# Patient Record
Sex: FEMALE | Race: WHITE | NOT HISPANIC OR LATINO | Employment: FULL TIME
[De-identification: names, ages, dates, MRNs, and addresses within clinical notes are randomized per-mention and may not be internally consistent; named-entity substitution may affect disease eponyms.]

---

## 2021-12-06 ENCOUNTER — OFFICE VISIT (OUTPATIENT)
Dept: MEDICAL GROUP | Facility: CLINIC | Age: 56
End: 2021-12-06
Payer: COMMERCIAL

## 2021-12-06 VITALS
HEIGHT: 67 IN | HEART RATE: 78 BPM | SYSTOLIC BLOOD PRESSURE: 110 MMHG | TEMPERATURE: 98 F | WEIGHT: 234 LBS | DIASTOLIC BLOOD PRESSURE: 80 MMHG | BODY MASS INDEX: 36.73 KG/M2 | OXYGEN SATURATION: 96 %

## 2021-12-06 DIAGNOSIS — R73.09 ELEVATED HEMOGLOBIN A1C: ICD-10-CM

## 2021-12-06 DIAGNOSIS — E06.3 HYPOTHYROIDISM DUE TO HASHIMOTO'S THYROIDITIS: ICD-10-CM

## 2021-12-06 DIAGNOSIS — H10.13 ALLERGIC CONJUNCTIVITIS OF BOTH EYES: ICD-10-CM

## 2021-12-06 DIAGNOSIS — I25.10 ASCVD (ARTERIOSCLEROTIC CARDIOVASCULAR DISEASE): ICD-10-CM

## 2021-12-06 DIAGNOSIS — H92.01 EAR PAIN, RIGHT: ICD-10-CM

## 2021-12-06 DIAGNOSIS — M75.52 SUBACROMIAL BURSITIS OF LEFT SHOULDER JOINT: ICD-10-CM

## 2021-12-06 DIAGNOSIS — E03.8 HYPOTHYROIDISM DUE TO HASHIMOTO'S THYROIDITIS: ICD-10-CM

## 2021-12-06 PROCEDURE — 99214 OFFICE O/P EST MOD 30 MIN: CPT | Performed by: FAMILY MEDICINE

## 2021-12-06 RX ORDER — LIDOCAINE 50 MG/G
1 PATCH TOPICAL EVERY 24 HOURS
Qty: 10 PATCH | Refills: 1 | Status: SHIPPED
Start: 2021-12-06 | End: 2022-01-03

## 2021-12-06 RX ORDER — OMEPRAZOLE 20 MG/1
CAPSULE, DELAYED RELEASE ORAL
COMMUNITY
Start: 2021-10-13 | End: 2022-01-03 | Stop reason: SDUPTHER

## 2021-12-06 RX ORDER — LEVOTHYROXINE SODIUM 0.12 MG/1
125 TABLET ORAL
COMMUNITY
End: 2022-01-03 | Stop reason: SDUPTHER

## 2021-12-06 RX ORDER — OLOPATADINE HYDROCHLORIDE 2 MG/ML
1 SOLUTION/ DROPS OPHTHALMIC DAILY
Qty: 2.5 ML | Refills: 5 | Status: SHIPPED | OUTPATIENT
Start: 2021-12-06 | End: 2024-01-25

## 2021-12-06 ASSESSMENT — FIBROSIS 4 INDEX: FIB4 SCORE: 0.87

## 2021-12-06 ASSESSMENT — PATIENT HEALTH QUESTIONNAIRE - PHQ9: CLINICAL INTERPRETATION OF PHQ2 SCORE: 0

## 2021-12-06 NOTE — PROGRESS NOTES
Subjective:   Kelly Wang is a 56 y.o. female here today to discuss patient here for lab follow-up and for a few acute issues.  She reports getting her labs done about 2 weeks ago, and they were ordered almost a year ago as she was concerned about Covid.  I reviewed labs with her today.  She is also concerned about some left shoulder pain with sudden onset about a week ago after sleeping.  Gets worse at night.  She is unable to lay on that shoulder due to discomfort.  She is significantly limited on her lateral range of motion, but otherwise is able to move it fine and does not recall a specific injury.  She is also having some issues with allergy eyes and is requesting a recommendation for medication.  She is also requesting a look in her ears.  She was prescribed a steroid drop recently for itchiness that she recently started taking and wants to make sure that her ears look okay.    ROS:  See HPI    Patient Active Problem List   Diagnosis   • Hypothyroidism due to Hashimoto's thyroiditis   • Elevated hemoglobin A1c       Current medicines (including changes today)  Current Outpatient Medications   Medication Sig Dispense Refill   • omeprazole (PRILOSEC) 20 MG delayed-release capsule      • levothyroxine (SYNTHROID) 125 MCG Tab Take 125 mcg by mouth every morning on an empty stomach. 1 po dialy     • lidocaine (LIDODERM) 5 % Patch Place 1 Patch on the skin every 24 hours. 10 Patch 1   • olopatadine HCl (PATADAY) 0.2 % ophthalmic solution Administer 1 Drop into both eyes every day. 2.5 mL 5     No current facility-administered medications for this visit.     Social History     Tobacco Use   • Smoking status: Never Smoker   • Smokeless tobacco: Never Used   Substance Use Topics   • Alcohol use: Not Currently   • Drug use: Not Currently     family history is not on file.     Objective:     Vitals:    12/06/21 1306   BP: 110/80   BP Location: Right arm   Pulse: 78   Temp: 36.7 °C (98 °F)   SpO2: 96%   Weight: 106 kg  "(234 lb)   Height: 1.702 m (5' 7\")     Body mass index is 36.65 kg/m².     Physical Exam:  Gen: Well developed, well nourished in no acute distress.   Skin: Pink, warm, and dry  HEENT: conjunctiva non-injected, sclera non-icteric. EOMs intact.  Cerumen noted, without irritation to the external canal.  Lungs: Effort is normal. Clear to auscultation bilaterally with good excursion.  CV: regular rate and rhythm without murmur  MSK: Left shoulder with significant tenderness to palpation over subacromial bursa.  Very limited lateral range of motion with active and passive range due to pain.  Normal rotator cuff exam  PSYCH: euthymic mood and reactive affect        Assessment and Plan:   The following treatment plan was discussed:   1. Subacromial bursitis of left shoulder joint  Exam consistent with the above.  I recommended watchful waiting, conservative treatment with NSAIDs, lidocaine patches, and active stretching and exercising at home.  Also discussed possibility of steroid injection, and formal physical therapy.  Patient will start with conservative management for now and will report back if she experiences no improvement in the next few weeks.  - lidocaine (LIDODERM) 5 % Patch; Place 1 Patch on the skin every 24 hours.  Dispense: 10 Patch; Refill: 1  2. Ear pain, right  Reassurance given.  Exam normal today.  3. Hypothyroidism due to Hashimoto's thyroiditis  Reviewed normal labs.  Repeat labs in 1 year.  4. ASCVD (arteriosclerotic cardiovascular disease)  ASCVD of 3.5 at this time.  I recommended no statin therapy at this time, and patient would like to try some lifestyle modifications in the interim.  5. Elevated hemoglobin A1c  Discussed with patient that is a possibility that this could be a measurement error, though I did recommend some lifestyle modification including diet and exercise.  She is to try to exercise and eat a little bit healthier over the next year, avoiding added sugars.  6. Allergic " conjunctivitis of both eyes  - olopatadine HCl (PATADAY) 0.2 % ophthalmic solution; Administer 1 Drop into both eyes every day.  Dispense: 2.5 mL; Refill: 5    Other orders  - omeprazole (PRILOSEC) 20 MG delayed-release capsule  - levothyroxine (SYNTHROID) 125 MCG Tab; Take 125 mcg by mouth every morning on an empty stomach. 1 po dialy          Followup: Return in about 1 year (around 12/6/2022).      Please note that this dictation was created using voice recognition software. I have worked with consultants from the vendor as well as technical experts from [facility] to optimize the interface. I have made every reasonable attempt to correct obvious errors, but I expect that there are errors of grammar and possibly content that I did not discover before finalizing the note.

## 2022-01-03 ENCOUNTER — PATIENT MESSAGE (OUTPATIENT)
Dept: MEDICAL GROUP | Facility: CLINIC | Age: 57
End: 2022-01-03

## 2022-01-03 RX ORDER — LEVOTHYROXINE SODIUM 0.12 MG/1
125 TABLET ORAL
Qty: 90 TABLET | Refills: 3 | Status: SHIPPED | OUTPATIENT
Start: 2022-01-03 | End: 2022-01-03 | Stop reason: SDUPTHER

## 2022-01-03 RX ORDER — IBUPROFEN 800 MG/1
800 TABLET ORAL EVERY 8 HOURS PRN
Qty: 90 TABLET | Refills: 3 | Status: SHIPPED | OUTPATIENT
Start: 2022-01-03

## 2022-01-03 RX ORDER — IBUPROFEN 800 MG/1
800 TABLET ORAL EVERY 8 HOURS PRN
Qty: 90 TABLET | Refills: 3 | Status: SHIPPED | OUTPATIENT
Start: 2022-01-03 | End: 2022-01-03 | Stop reason: SDUPTHER

## 2022-01-03 RX ORDER — LEVOTHYROXINE SODIUM 0.12 MG/1
125 TABLET ORAL
Qty: 90 TABLET | Refills: 3 | Status: SHIPPED
Start: 2022-01-03 | End: 2022-01-03

## 2022-01-03 RX ORDER — OMEPRAZOLE 20 MG/1
20 CAPSULE, DELAYED RELEASE ORAL DAILY
Qty: 90 CAPSULE | Refills: 3 | Status: SHIPPED | OUTPATIENT
Start: 2022-01-03 | End: 2022-10-05

## 2022-01-03 RX ORDER — OMEPRAZOLE 20 MG/1
20 CAPSULE, DELAYED RELEASE ORAL DAILY
Qty: 90 CAPSULE | Refills: 3 | Status: SHIPPED
Start: 2022-01-03 | End: 2022-01-03

## 2022-01-03 RX ORDER — LIDOCAINE 4 G/G
1 PATCH TOPICAL 2 TIMES DAILY PRN
Qty: 30 PATCH | Refills: 2 | Status: SHIPPED | OUTPATIENT
Start: 2022-01-03

## 2022-01-03 RX ORDER — OMEPRAZOLE 20 MG/1
20 CAPSULE, DELAYED RELEASE ORAL DAILY
Qty: 90 CAPSULE | Refills: 3 | Status: SHIPPED | OUTPATIENT
Start: 2022-01-03 | End: 2022-01-03 | Stop reason: SDUPTHER

## 2022-01-03 RX ORDER — LIDOCAINE 4 G/G
1 PATCH TOPICAL 2 TIMES DAILY PRN
Qty: 30 PATCH | Refills: 2 | Status: SHIPPED | OUTPATIENT
Start: 2022-01-03 | End: 2022-01-03 | Stop reason: SDUPTHER

## 2022-01-03 RX ORDER — LEVOTHYROXINE SODIUM 0.12 MG/1
125 TABLET ORAL
Qty: 90 TABLET | Refills: 3 | Status: SHIPPED | OUTPATIENT
Start: 2022-01-03 | End: 2022-10-05

## 2022-01-03 NOTE — TELEPHONE ENCOUNTER
----- Message from Kelly Wang sent at 1/3/2022  1:24 PM PST -----  Regarding: Bursitis   Hi Tiffany could you please call in antibiotics I want to try that before a cortisone  shot also call in 4 % lidocaine patch ins didn’t cover 5% and if you could prescribe ibuprofen 800 mg for pain Kelly [phone]

## 2022-01-17 ENCOUNTER — PATIENT MESSAGE (OUTPATIENT)
Dept: MEDICAL GROUP | Facility: CLINIC | Age: 57
End: 2022-01-17

## 2022-01-18 NOTE — PROGRESS NOTES
Edward Ho, I'm not sure who I am supposed to route this to.  Please get this patient scheduled for telemed visit.  Can be with me if I have availability or can be with any provider.  Thanks!

## 2022-01-20 ENCOUNTER — TELEMEDICINE (OUTPATIENT)
Dept: MEDICAL GROUP | Facility: OTHER | Age: 57
End: 2022-01-20
Payer: COMMERCIAL

## 2022-01-20 VITALS — TEMPERATURE: 98.6 F

## 2022-01-20 DIAGNOSIS — M75.52 SUBACROMIAL BURSITIS OF LEFT SHOULDER JOINT: ICD-10-CM

## 2022-01-20 DIAGNOSIS — L03.114 CELLULITIS OF LEFT UPPER EXTREMITY: ICD-10-CM

## 2022-01-20 PROCEDURE — 99213 OFFICE O/P EST LOW 20 MIN: CPT | Mod: GT | Performed by: FAMILY MEDICINE

## 2022-01-20 RX ORDER — COVID-19 MOLECULAR TEST ASSAY
KIT MISCELLANEOUS
COMMUNITY
Start: 2021-12-05 | End: 2022-01-20

## 2022-01-20 RX ORDER — CEPHALEXIN 500 MG/1
500 CAPSULE ORAL 3 TIMES DAILY
Qty: 21 CAPSULE | Refills: 0 | Status: SHIPPED | OUTPATIENT
Start: 2022-01-20 | End: 2022-01-27

## 2022-01-21 NOTE — PROGRESS NOTES
Subjective:   Kelly Wang is a 56 y.o. female seen over Zoom due to the COVID-19 pandemic.  Patient consented to Zoom visit and her identity was confirmed.  Patient would like to discuss left shoulder pain.  We discussed this at her last visit, and I felt at the time that this was due to subacromial bursitis.  She feels like since I last saw her, her pain has progressively worsened.  She reports poor range of motion and is concerned that it could be infected due to a new puncture wound that she noted on the top of her left shoulder about a week ago.  She feels that her shoulder is more swollen than the other side and warmer.  She feels that the skin is a little bit more red.  She denies fever, chills, nausea, vomiting.  She has otherwise been well.  She has not had any other new injuries.    ROS:  See HPI    Patient Active Problem List   Diagnosis   • Hypothyroidism due to Hashimoto's thyroiditis   • Elevated hemoglobin A1c       Current medicines (including changes today)  Current Outpatient Medications   Medication Sig Dispense Refill   • cephALEXin (KEFLEX) 500 MG Cap Take 1 Capsule by mouth 3 times a day for 7 days. 21 Capsule 0   • omeprazole (PRILOSEC) 20 MG delayed-release capsule Take 1 Capsule by mouth every day. 90 Capsule 3   • Lidocaine 4 % Patch Apply 1 Patch topically 2 times a day as needed (Shoulder pain). 30 Patch 2   • levothyroxine (SYNTHROID) 125 MCG Tab Take 1 Tablet by mouth every morning on an empty stomach. 1 po dialy 90 Tablet 3   • ibuprofen (MOTRIN) 800 MG Tab Take 1 Tablet by mouth every 8 hours as needed for Mild Pain or Moderate Pain. 90 Tablet 3   • olopatadine HCl (PATADAY) 0.2 % ophthalmic solution Administer 1 Drop into both eyes every day. 2.5 mL 5     No current facility-administered medications for this visit.     Social History     Tobacco Use   • Smoking status: Never Smoker   • Smokeless tobacco: Never Used   Vaping Use   • Vaping Use: Never used   Substance Use Topics   •  Alcohol use: Not Currently   • Drug use: Not Currently     family history is not on file.     Objective:     Vitals:    01/20/22 1544   Temp: 37 °C (98.6 °F)   TempSrc: Temporal     There is no height or weight on file to calculate BMI.     Physical Exam:  Gen: Well developed, well nourished in no acute distress.   Skin: Pink, warm, and dry.  Possible erythema noted to skin of top of shoulder compared to right shoulder. Difficult to tell due to lighting.  Puncture wound noted to top of left shoulder.  Ext:  No swelling or ROM differences noted visually between shoulders.        Assessment and Plan:   The following treatment plan was discussed:   1. Cellulitis of left upper extremity  Fairly difficult to evaluate over Zoom, though I am fairly confident based on my visual inspection that the patient does not have a septic joint.  There is possible increased erythema over her left upper extremity with a puncture wound.  I will treat for cellulitis, though I stated if this does not improve her symptoms at all, patient will need to be seen in clinic for an evaluation or sooner if symptoms worsen over the course of time.  I discussed risks and benefits of medication.  - cephALEXin (KEFLEX) 500 MG Cap; Take 1 Capsule by mouth 3 times a day for 7 days.  Dispense: 21 Capsule; Refill: 0    2. Subacromial bursitis of left shoulder joint  I still believe that a majority of her pain might be due to subacromial bursitis based on my exam when she was in the office.  I do not have strong suspicion for septic bursitis.  I will have her treat with antibiotics per above, though I encouraged anti-inflammatories, and discussed different strengthening and stretching exercises that she can do at home, which may also help with her symptoms.  I will have her follow-up in clinic for an evaluation if there is no improvement in her symptoms.  I did discuss steroid injection, which she would like to defer for this time.        Followup: Return  if symptoms worsen or fail to improve.    Please note that this dictation was created using voice recognition software. I have worked with consultants from the vendor as well as [facility] to optimize the interface. I have made every reasonable attempt to correct obvious errors, but I expect that there are errors of grammar and possibly content that I did not discover before finalizing the note.

## 2022-09-26 ENCOUNTER — OFFICE VISIT (OUTPATIENT)
Dept: URGENT CARE | Facility: CLINIC | Age: 57
End: 2022-09-26
Payer: COMMERCIAL

## 2022-09-26 VITALS
DIASTOLIC BLOOD PRESSURE: 84 MMHG | RESPIRATION RATE: 18 BRPM | SYSTOLIC BLOOD PRESSURE: 118 MMHG | OXYGEN SATURATION: 97 % | HEART RATE: 74 BPM | WEIGHT: 240 LBS | TEMPERATURE: 98.2 F | BODY MASS INDEX: 37.67 KG/M2 | HEIGHT: 67 IN

## 2022-09-26 DIAGNOSIS — H92.13 EAR DISCHARGE OF BOTH EARS: ICD-10-CM

## 2022-09-26 DIAGNOSIS — H72.93 TYMPANIC MEMBRANE PERFORATION, BILATERAL: ICD-10-CM

## 2022-09-26 DIAGNOSIS — H91.93 BILATERAL HEARING LOSS, UNSPECIFIED HEARING LOSS TYPE: ICD-10-CM

## 2022-09-26 PROCEDURE — 99204 OFFICE O/P NEW MOD 45 MIN: CPT | Performed by: PHYSICIAN ASSISTANT

## 2022-09-26 RX ORDER — KETOCONAZOLE 20 MG/ML
SHAMPOO TOPICAL
COMMUNITY
Start: 2022-07-18 | End: 2024-01-25

## 2022-09-26 RX ORDER — AMOXICILLIN 500 MG/1
500 CAPSULE ORAL 2 TIMES DAILY
Qty: 14 CAPSULE | Refills: 0 | Status: SHIPPED | OUTPATIENT
Start: 2022-09-26 | End: 2022-10-03

## 2022-09-26 RX ORDER — CIPROFLOXACIN AND DEXAMETHASONE 3; 1 MG/ML; MG/ML
4 SUSPENSION/ DROPS AURICULAR (OTIC) 2 TIMES DAILY
Qty: 6 ML | Refills: 0 | Status: SHIPPED | OUTPATIENT
Start: 2022-09-26 | End: 2022-10-03

## 2022-09-26 ASSESSMENT — ENCOUNTER SYMPTOMS
WHEEZING: 0
EYE REDNESS: 0
HEADACHES: 0
CHILLS: 0
COUGH: 0
SHORTNESS OF BREATH: 0
EYE PAIN: 0
SORE THROAT: 0
DIAPHORESIS: 0
SINUS PAIN: 0
FEVER: 0
EYE DISCHARGE: 0
DIZZINESS: 0

## 2022-09-26 ASSESSMENT — FIBROSIS 4 INDEX: FIB4 SCORE: 0.88

## 2022-09-26 NOTE — PROGRESS NOTES
"  Subjective:     Kelly Wang  is a 57 y.o. female who presents for Otalgia (Bilateral, fullness on right side )       She presents today with bilateral otalgia, bilateral ear drainage, bilateral hearing loss.  She states that her symptoms have been ongoing over the last 1-2 years, but fluctuate in severity.  Denies any specific injury or trauma associated with symptom onset.  Has previously seen a provider for the symptoms when they began, she was told at that time that she did have a TM rupture and was referred to ENT, she did not follow-up on this ENT appointment.  She denies fever/chills/sweats, chest pain, shortness of breath.  She denies changes in vision, blurry vision, proprioception issues, vertigo episodes, pain over the mastoid.     Review of Systems   Constitutional:  Negative for chills, diaphoresis, fever and malaise/fatigue.   HENT:  Positive for ear discharge, ear pain and hearing loss. Negative for congestion, sinus pain, sore throat and tinnitus.    Eyes:  Negative for pain, discharge and redness.   Respiratory:  Negative for cough, shortness of breath and wheezing.    Cardiovascular:  Negative for chest pain.   Neurological:  Negative for dizziness and headaches.    No Known Allergies  History reviewed. No pertinent past medical history.     Objective:   /84   Pulse 74   Temp 36.8 °C (98.2 °F) (Temporal)   Resp 18   Ht 1.702 m (5' 7\")   Wt 109 kg (240 lb)   SpO2 97%   BMI 37.59 kg/m²   Physical Exam  Vitals and nursing note reviewed.   Constitutional:       General: She is not in acute distress.     Appearance: Normal appearance. She is not ill-appearing, toxic-appearing or diaphoretic.   HENT:      Head: Normocephalic.      Ears:      Comments: Examination of bilateral ears does reveal TM perforation, purulent drainage within the external canal.  Patient does have hearing loss on exam as compared to the average person of her age.     Nose: No congestion or rhinorrhea.      " Mouth/Throat:      Mouth: Mucous membranes are moist.      Pharynx: No oropharyngeal exudate or posterior oropharyngeal erythema.   Eyes:      General:         Right eye: No discharge.         Left eye: No discharge.      Conjunctiva/sclera: Conjunctivae normal.   Cardiovascular:      Rate and Rhythm: Normal rate and regular rhythm.   Pulmonary:      Effort: Pulmonary effort is normal. No respiratory distress.      Breath sounds: Normal breath sounds. No stridor. No wheezing or rhonchi.   Musculoskeletal:      Cervical back: Neck supple.   Lymphadenopathy:      Cervical: No cervical adenopathy.   Neurological:      General: No focal deficit present.      Mental Status: She is alert and oriented to person, place, and time.   Psychiatric:         Mood and Affect: Mood normal.         Behavior: Behavior normal.         Thought Content: Thought content normal.         Judgment: Judgment normal.           Diagnostic testing: None    Assessment/Plan:     Encounter Diagnoses   Name Primary?    Tympanic membrane perforation, bilateral     Ear discharge of both ears     Bilateral hearing loss, unspecified hearing loss type         Plan for care for today's complaint includes amoxicillin and Ciprodex for infection prophylaxis for bilateral ears.  We will do combination antibiotics as she does have TM perforation of both ears.  We will send a referral to ENT for further evaluation and management of chronic TM perforation.  Return to the urgent care or follow-up in the emergency department if symptoms worsen.  Prescription for amoxicillin, Ciprodex provided.    See AVS Instructions below for written guidance provided to patient on after-visit management and care in addition to our verbal discussion during the visit.    Please note that this dictation was created using voice recognition software. I have attempted to correct all errors, but there may be sound-alike, spelling, grammar and possibly content errors that I did not  discover before finalizing the note.    Girardlos Jovel PA-C

## 2024-01-04 NOTE — TELEPHONE ENCOUNTER
VOICEMAIL  1. Caller Name: Kelly Wang                       Call Back Number: [phone]     2. Message: Kelly has made an appointment for 01/15/2024 with Dr. Uyen Garcia. However she has enough medication for 1 week. She was wondering if she can get a refill to last her until her appointment.     3. Patient approves office to leave a detailed voicemail/MyChart message: N\A

## 2024-01-05 RX ORDER — OMEPRAZOLE 20 MG/1
20 CAPSULE, DELAYED RELEASE ORAL DAILY
Qty: 90 CAPSULE | Refills: 3 | Status: SHIPPED | OUTPATIENT
Start: 2024-01-05

## 2024-01-05 RX ORDER — LEVOTHYROXINE SODIUM 0.12 MG/1
125 TABLET ORAL
Qty: 90 TABLET | Refills: 3 | Status: SHIPPED | OUTPATIENT
Start: 2024-01-05 | End: 2024-01-25

## 2024-01-15 ENCOUNTER — OFFICE VISIT (OUTPATIENT)
Dept: MEDICAL GROUP | Facility: CLINIC | Age: 59
End: 2024-01-15
Payer: COMMERCIAL

## 2024-01-15 VITALS
HEART RATE: 75 BPM | HEIGHT: 67 IN | DIASTOLIC BLOOD PRESSURE: 80 MMHG | SYSTOLIC BLOOD PRESSURE: 120 MMHG | WEIGHT: 223 LBS | RESPIRATION RATE: 12 BRPM | OXYGEN SATURATION: 95 % | TEMPERATURE: 97.8 F | BODY MASS INDEX: 35 KG/M2

## 2024-01-15 DIAGNOSIS — H62.40 FUNGAL EAR INFECTION: ICD-10-CM

## 2024-01-15 DIAGNOSIS — R73.09 ELEVATED HEMOGLOBIN A1C: ICD-10-CM

## 2024-01-15 DIAGNOSIS — E03.8 HYPOTHYROIDISM DUE TO HASHIMOTO'S THYROIDITIS: ICD-10-CM

## 2024-01-15 DIAGNOSIS — B36.9 FUNGAL EAR INFECTION: ICD-10-CM

## 2024-01-15 DIAGNOSIS — D17.21 LIPOMA OF RIGHT UPPER EXTREMITY: ICD-10-CM

## 2024-01-15 DIAGNOSIS — Z00.00 ANNUAL PHYSICAL EXAM: ICD-10-CM

## 2024-01-15 DIAGNOSIS — E06.3 HYPOTHYROIDISM DUE TO HASHIMOTO'S THYROIDITIS: ICD-10-CM

## 2024-01-15 PROCEDURE — 3074F SYST BP LT 130 MM HG: CPT

## 2024-01-15 PROCEDURE — 3079F DIAST BP 80-89 MM HG: CPT

## 2024-01-15 PROCEDURE — 99396 PREV VISIT EST AGE 40-64: CPT | Mod: GE

## 2024-01-15 NOTE — ASSESSMENT & PLAN NOTE
Vital signs stable.  BMI 34.  PE unremarkable.  Last labs were done in 2021 notable for elevated total cholesterol and LDL, TSH within normal limits.    Plan  - Annual labs: CBC, CMP, lipid panel, A1c  - Ordered annual mammogram  - Patient will make follow-up appointment for Pap smear  - Patient declined any vaccinations or testing for STI  - Will discuss repeat colonoscopy and/or Cologuard testing in 6 years.  - Encourage patient to increase fiber intake and use MiraLAX to help with maintaining regular bowel movements to prevent episodes of diverticulitis.  - Follow-up appointment in 1 year or as needed

## 2024-01-15 NOTE — ASSESSMENT & PLAN NOTE
PE: Right forearm proximal wrist a lipoma measuring 6 x 4 cm noted.  Deep lipoma palpated on the left medial aspect of anterior arm.  Patient does not wish them to be removed at this time.  Will continue to follow.

## 2024-01-15 NOTE — PROGRESS NOTES
CC:Diagnoses of Annual physical exam, Hypothyroidism due to Hashimoto's thyroiditis, Fungal ear infection, Lipoma of right upper extremity, and Elevated hemoglobin A1c were pertinent to this visit.    HISTORY OF PRESENT ILLNESS: Patient is a 58 y.o. female new patient who presents today for the following:    Problem   Fungal Ear Infection    Patient with history of bilateral fungal ear infections.  Infections are usually preceded by episodes of intense itching, denies any symptoms at this time.  Patient reports perforated left eardrum.  She is currently being followed by ENT with appointments every 6 months.     Lipoma of Right Upper Extremity    Patient with history of ganglion cyst in right wrist.  Patient has a large lump on the posterior aspect of her right forearm proximal to the wrist.  Patient also reports a lump deep on medial portion of left anterior arm.  Patient reports that she is not continue to eat anything done about these.  Patient does not want to have any surgery performed.  Patient just wants to be in her medical record.     Annual Physical Exam    Patient presents to clinic for her annual wellness exam.  Patient reports her last pap and mammogram was over 5 years ago, which were normal.  Patient has never had an abnormal Pap or mammogram.  Patient reports she had a colonoscopy 4 years ago and was told she had diverticulosis.  Patient has been  for over 30 years and has no concern for STIs.  Patient started menopause at 51 and has noticed a decrease in her libido over the last several years.  Patient also reports that she had an area of dry skin on her labia which has since went away.  Patient declines any vaccinations.     Hypothyroidism Due to Hashimoto's Thyroiditis    Patient with history of Hashimoto's thyroiditis currently taking levothyroxine 125 mcg daily.  Patient's last labs done in 2021 with TSH in normal limits.  Patient reports that she is feeling more fatigued than usual.   "Patient also reports dry skin and constipation.  Patient denies any palpitations.     Elevated Hemoglobin A1c    A1c elevated in 2021 at 5.7.    Plan  - Repeat A1c        Patient Active Problem List    Diagnosis Date Noted    Fungal ear infection 01/15/2024    Lipoma of right upper extremity 01/15/2024    Annual physical exam 01/15/2024    Hypothyroidism due to Hashimoto's thyroiditis 12/06/2021    Elevated hemoglobin A1c 12/06/2021      Allergies:Patient has no known allergies.    Current Outpatient Medications   Medication Sig Dispense Refill    levothyroxine (SYNTHROID) 125 MCG Tab Take 1 Tablet by mouth every morning on an empty stomach. 90 Tablet 3    omeprazole (PRILOSEC) 20 MG delayed-release capsule Take 1 Capsule by mouth every day. 90 Capsule 3    ketoconazole (NIZORAL) 2 % shampoo       Lidocaine 4 % Patch Apply 1 Patch topically 2 times a day as needed (Shoulder pain). 30 Patch 2    ibuprofen (MOTRIN) 800 MG Tab Take 1 Tablet by mouth every 8 hours as needed for Mild Pain or Moderate Pain. 90 Tablet 3    olopatadine HCl (PATADAY) 0.2 % ophthalmic solution Administer 1 Drop into both eyes every day. 2.5 mL 5     No current facility-administered medications for this visit.     Social History     Tobacco Use    Smoking status: Never    Smokeless tobacco: Never   Vaping Use    Vaping Use: Never used   Substance Use Topics    Alcohol use: Not Currently    Drug use: Not Currently     Social History     Social History Narrative    Not on file     History reviewed. No pertinent family history.    Exam:    /80 (BP Location: Right arm, Patient Position: Sitting, BP Cuff Size: Large adult)   Pulse 75   Temp 36.6 °C (97.8 °F) (Temporal)   Resp 12   Ht 1.702 m (5' 7\")   Wt 101 kg (223 lb)   SpO2 95%  Body mass index is 34.93 kg/m².    General:  Well nourished, well developed female in NAD  HENT: Atraumatic, normocephalic  EYES: Extraocular movements intact, pupils equal and reactive to light  NECK: " Supple, FROM  CHEST: No deformities, Equal chest expansion  RESP: Unlabored, no stridor or audible wheeze  ABD: Non-Distended  Extremities: No Clubbing, Cyanosis, or Edema. Right forearm proximal wrist a lipoma measuring 6 x 4 cm noted.  Deep lipoma palpated on the left medial aspect of anterior arm.  Skin: Warm/dry, without rashes  Neuro: A/O x 4, CN 2-12 Grossly intact, Motor/sensory grossly intact  Psych: Normal behavior, normal affect    LABS: Results reviewed and discussed with the patient, questions answered.    ASSESSMENT/PLAN:    Annual physical exam  Vital signs stable.  BMI 34.  PE unremarkable.  Last labs were done in 2021 notable for elevated total cholesterol and LDL, TSH within normal limits.    Plan  - Annual labs: CBC, CMP, lipid panel, A1c  - Ordered annual mammogram  - Patient will make follow-up appointment for Pap smear  - Patient declined any vaccinations or testing for STI  - Will discuss repeat colonoscopy and/or Cologuard testing in 6 years.  - Encourage patient to increase fiber intake and use MiraLAX to help with maintaining regular bowel movements to prevent episodes of diverticulitis.  - Follow-up appointment in 1 year or as needed    Fungal ear infection  Patient is being followed by ENT.  Patient denies any signs of infection at this time.    Hypothyroidism due to Hashimoto's thyroiditis  History of Hashimoto's thyroiditis compliant on 125 mcg of levothyroxine daily.  Complaint of fatigue, dry skin and constipation.  Denies palpitations.  PE: Unremarkable.  TSH in 2021 3.04.    Plan  - Labs ordered: TSH with reflex T4  - Encourage patient to use MiraLAX to help with constipation    Lipoma of right upper extremity  PE: Right forearm proximal wrist a lipoma measuring 6 x 4 cm noted.  Deep lipoma palpated on the left medial aspect of anterior arm.  Patient does not wish them to be removed at this time.  Will continue to follow.    Return in about 4 weeks (around 2/12/2024) for  Short.    Uyen Ragsdale MD PGY2

## 2024-01-15 NOTE — ASSESSMENT & PLAN NOTE
History of Hashimoto's thyroiditis compliant on 125 mcg of levothyroxine daily.  Complaint of fatigue, dry skin and constipation.  Denies palpitations.  PE: Unremarkable.  TSH in 2021 3.04.    Plan  - Labs ordered: TSH with reflex T4  - Encourage patient to use MiraLAX to help with constipation

## 2024-01-18 ENCOUNTER — TELEPHONE (OUTPATIENT)
Dept: MEDICAL GROUP | Facility: CLINIC | Age: 59
End: 2024-01-18
Payer: COMMERCIAL

## 2024-01-18 NOTE — TELEPHONE ENCOUNTER
Caller Name: Kelly  Call Back Number: [phone] (home) [phone] (work)      How would the patient prefer to be contacted with a response: Phone call do NOT leave a detailed message    Lab results   patient called requesting to speak to you about her lab results.

## 2024-01-23 ENCOUNTER — TELEPHONE (OUTPATIENT)
Dept: MEDICAL GROUP | Facility: CLINIC | Age: 59
End: 2024-01-23

## 2024-01-23 NOTE — TELEPHONE ENCOUNTER
Patient would like a call from medical assistant or Dr. Garcia, in regards to her abnormal lab results, she states she cannot do video visit as she is at work today with Dr. Quintero, she is scheduled for Thursday the 25 but would like a call back before as she is extremely concerned about her lab results, thank you

## 2024-01-25 ENCOUNTER — OFFICE VISIT (OUTPATIENT)
Dept: MEDICAL GROUP | Facility: CLINIC | Age: 59
End: 2024-01-25
Payer: COMMERCIAL

## 2024-01-25 VITALS
WEIGHT: 231 LBS | BODY MASS INDEX: 36.26 KG/M2 | HEIGHT: 67 IN | SYSTOLIC BLOOD PRESSURE: 128 MMHG | TEMPERATURE: 97.6 F | OXYGEN SATURATION: 96 % | DIASTOLIC BLOOD PRESSURE: 84 MMHG | HEART RATE: 90 BPM

## 2024-01-25 DIAGNOSIS — E78.2 MODERATE MIXED HYPERLIPIDEMIA NOT REQUIRING STATIN THERAPY: ICD-10-CM

## 2024-01-25 DIAGNOSIS — E06.3 HYPOTHYROIDISM DUE TO HASHIMOTO'S THYROIDITIS: ICD-10-CM

## 2024-01-25 DIAGNOSIS — Z71.2 ENCOUNTER TO DISCUSS TEST RESULTS: ICD-10-CM

## 2024-01-25 DIAGNOSIS — E03.8 HYPOTHYROIDISM DUE TO HASHIMOTO'S THYROIDITIS: ICD-10-CM

## 2024-01-25 PROCEDURE — 3079F DIAST BP 80-89 MM HG: CPT

## 2024-01-25 PROCEDURE — 3074F SYST BP LT 130 MM HG: CPT

## 2024-01-25 PROCEDURE — 99213 OFFICE O/P EST LOW 20 MIN: CPT | Mod: GE

## 2024-01-25 RX ORDER — AMOXICILLIN 500 MG/1
CAPSULE ORAL
COMMUNITY
Start: 2024-01-22 | End: 2024-01-25

## 2024-01-25 RX ORDER — LEVOTHYROXINE SODIUM 112 UG/1
112 TABLET ORAL
Qty: 30 TABLET | Refills: 2 | Status: SHIPPED | OUTPATIENT
Start: 2024-01-25 | End: 2024-01-29

## 2024-01-25 ASSESSMENT — PATIENT HEALTH QUESTIONNAIRE - PHQ9: CLINICAL INTERPRETATION OF PHQ2 SCORE: 0

## 2024-01-25 ASSESSMENT — FIBROSIS 4 INDEX: FIB4 SCORE: 1.08

## 2024-01-25 NOTE — PROGRESS NOTES
CC:Diagnoses of Encounter to discuss test results, Moderate mixed hyperlipidemia not requiring statin therapy, and Hypothyroidism due to Hashimoto's thyroiditis were pertinent to this visit.    HISTORY OF PRESENT ILLNESS: Patient is a 58 y.o. female established patient who presents today for the following:    Problem   Encounter to Discuss Test Results    Patient was scheduled for an appointment today to address abnormal lab values     Moderate Mixed Hyperlipidemia Not Requiring Statin Therapy    Patient's labs showed elevated total cholesterol and LDL.  Patient's diet is poor in fruits and vegetables.  Patient reports she gets very little exercise.  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  The patient's parents lived into their 90s with family history significant for MI in father and A-fib in mother     Hypothyroidism Due to Hashimoto's Thyroiditis    Last Visit:  Patient with history of Hashimoto's thyroiditis currently taking levothyroxine 125 mcg daily.  Patient's last labs done in 2021 with TSH in normal limits.  Patient reports that she is feeling more fatigued than usual.  Patient also reports dry skin and constipation.  Patient denies any palpitations.     Today's visits:  TSH ordered from last visit decreased at 0.169.  Patient compliant with levothyroxine 125 mg daily.  Patient is very concerned about her TSH levels.        Patient Active Problem List    Diagnosis Date Noted    Encounter to discuss test results 01/25/2024    Moderate mixed hyperlipidemia not requiring statin therapy 01/25/2024    Fungal ear infection 01/15/2024    Lipoma of right upper extremity 01/15/2024    Annual physical exam 01/15/2024    Hypothyroidism due to Hashimoto's thyroiditis 12/06/2021    Elevated hemoglobin A1c 12/06/2021      Allergies:Patient has no known allergies.    Current Outpatient Medications   Medication Sig Dispense Refill    levothyroxine (SYNTHROID) 112 MCG Tab Take 1 Tablet by mouth  "every morning on an empty stomach. 30 Tablet 2    omeprazole (PRILOSEC) 20 MG delayed-release capsule Take 1 Capsule by mouth every day. 90 Capsule 3    Lidocaine 4 % Patch Apply 1 Patch topically 2 times a day as needed (Shoulder pain). 30 Patch 2    ibuprofen (MOTRIN) 800 MG Tab Take 1 Tablet by mouth every 8 hours as needed for Mild Pain or Moderate Pain. 90 Tablet 3     No current facility-administered medications for this visit.     Social History     Tobacco Use    Smoking status: Never    Smokeless tobacco: Never   Vaping Use    Vaping Use: Never used   Substance Use Topics    Alcohol use: Not Currently    Drug use: Not Currently     Social History     Social History Narrative    Not on file     History reviewed. No pertinent family history.    Exam:    /84 (BP Location: Left arm, Patient Position: Sitting, BP Cuff Size: Large adult)   Pulse 90   Temp 36.4 °C (97.6 °F) (Temporal)   Ht 1.702 m (5' 7\")   Wt 105 kg (231 lb)   SpO2 96%  Body mass index is 36.18 kg/m².    General:  Well nourished, well developed female in NAD  HENT: Atraumatic, normocephalic  EYES: Extraocular movements intact, pupils equal and reactive to light  NECK: Supple, FROM  CHEST: No deformities, Equal chest expansion  RESP: Unlabored, no stridor or audible wheeze  ABD: Non-Distended  Extremities: No Clubbing, Cyanosis, or Edema  Skin: Warm/dry, without rashes  Neuro: A/O x 4, CN 2-12 Grossly intact, Motor/sensory grossly intact  Psych: Normal behavior, normal affect    LABS: Results reviewed and discussed with the patient, questions answered.   Latest Reference Range & Units 01/15/24 07:03   WBC 3.4 - 10.8 x10E3/uL 6.7   RBC 3.77 - 5.28 x10E6/uL 4.97   Hemoglobin 11.1 - 15.9 g/dL 14.3   Hematocrit 34.0 - 46.6 % 43.1   MCV 79 - 97 fL 87   MCH 26.6 - 33.0 pg 28.8   MCHC 31.5 - 35.7 g/dL 33.2   RDW 11.7 - 15.4 % 13.1   Platelet Count 150 - 450 x10E3/uL 264   Immature Cells  CANCELED   Neutrophils-Polys Not Estab. % 54 "   Neutrophils (Absolute) 1.4 - 7.0 x10E3/uL 3.6   Lymphocytes Not Estab. % 34   Lymphs (Absolute) 0.7 - 3.1 x10E3/uL 2.3   Monocytes Not Estab. % 7   Monos (Absolute) 0.1 - 0.9 x10E3/uL 0.5   Eosinophils Not Estab. % 4   Eos (Absolute) 0.0 - 0.4 x10E3/uL 0.3   Basophils Not Estab. % 1   Baso (Absolute) 0.0 - 0.2 x10E3/uL 0.1   Immature Granulocytes Not Estab. % 0   Immature Granulocytes (abs) 0.0 - 0.1 x10E3/uL 0.0   Nucleated RBC  CANCELED   Comments-Diff  CANCELED   Sodium 134 - 144 mmol/L 142   Potassium 3.5 - 5.2 mmol/L 4.7   Chloride 96 - 106 mmol/L 104   Co2 20 - 29 mmol/L 23   Glucose 70 - 99 mg/dL 85   Bun 6 - 24 mg/dL 9   Creatinine 0.57 - 1.00 mg/dL 0.87   Bun-Creatinine Ratio 9 - 23  10   Calcium 8.7 - 10.2 mg/dL 9.0   AST(SGOT) 0 - 40 IU/L 21   ALT(SGPT) 0 - 32 IU/L 19   Alkaline Phosphatase 44 - 121 IU/L 102   Total Bilirubin 0.0 - 1.2 mg/dL 0.5   Albumin 3.8 - 4.9 g/dL 4.2   Total Protein 6.0 - 8.5 g/dL 7.1   Globulin 1.5 - 4.5 g/dL 2.9   A-G Ratio 1.2 - 2.2  1.4   Glycohemoglobin 4.8 - 5.6 % 5.8 (H)   Cholesterol,Tot 100 - 199 mg/dL 228 (H)   Triglycerides 0 - 149 mg/dL 121   HDL >39 mg/dL 58   LDL Chol Calc (NIH) 0 - 99 mg/dL 148 (H)   VLDL Cholesterol Calc 5 - 40 mg/dL 22   TSH 0.450 - 4.500 uIU/mL 0.169 (L)   (H): Data is abnormally high  (L): Data is abnormally low  ASSESSMENT/PLAN:    Encounter to discuss test results  Abnormal lab values:  - TSH 0.69  - Total cholesterol 228,     Hypothyroidism due to Hashimoto's thyroiditis  Currently on 125 mcg taken daily.  Abnormal lab result: TSH 0.169    Plan  - Will decrease levothyroxine from 125 mcg to 112 mcg taken daily.  - Repeat TSH with reflex T4 in 6 weeks  - Follow-up appointment in 6 weeks after labs are completed    Moderate mixed hyperlipidemia not requiring statin therapy  Total cholesterol 228,   Diet poor in fruits and vegetables, limited exercise  Sedentary lifestyle, does computer work    Plan  - Discussed lifestyle  modifications about help to lower patient's cholesterol and LDL.  Encouraged diet rich in fruits and vegetables with at least 150 minutes of moderate exercise weekly.  Encouraged increased water intake.  - Encourage patient to keep food journal and exercise journal and bring to next appointment    Return in about 6 weeks (around 3/7/2024).    Uyen Ragsdale MD PGY2

## 2024-01-26 NOTE — TELEPHONE ENCOUNTER
Received request via: Pharmacy    Was the patient seen in the last year in this department? Yes    Does the patient have an active prescription (recently filled or refills available) for medication(s) requested? No    [facility]    Does the patient have [facility] and need 100 day supply (blood pressure, diabetes and cholesterol meds only)? Patient does not have SCP

## 2024-01-26 NOTE — ASSESSMENT & PLAN NOTE
Currently on 125 mcg taken daily.  Abnormal lab result: TSH 0.169    Plan  - Will decrease levothyroxine from 125 mcg to 112 mcg taken daily.  - Repeat TSH with reflex T4 in 6 weeks  - Follow-up appointment in 6 weeks after labs are completed

## 2024-01-26 NOTE — ASSESSMENT & PLAN NOTE
Total cholesterol 228,   Diet poor in fruits and vegetables, limited exercise  Sedentary lifestyle, does computer work    Plan  - Discussed lifestyle modifications about help to lower patient's cholesterol and LDL.  Encouraged diet rich in fruits and vegetables with at least 150 minutes of moderate exercise weekly.  Encouraged increased water intake.  - Encourage patient to keep food journal and exercise journal and bring to next appointment

## 2024-01-29 ENCOUNTER — APPOINTMENT (OUTPATIENT)
Dept: RADIOLOGY | Facility: MEDICAL CENTER | Age: 59
End: 2024-01-29
Payer: COMMERCIAL

## 2024-01-29 DIAGNOSIS — Z00.00 ANNUAL PHYSICAL EXAM: ICD-10-CM

## 2024-01-29 PROCEDURE — 77067 SCR MAMMO BI INCL CAD: CPT

## 2024-01-29 RX ORDER — LEVOTHYROXINE SODIUM 112 UG/1
112 TABLET ORAL
Qty: 90 TABLET | Refills: 0 | Status: SHIPPED | OUTPATIENT
Start: 2024-01-29

## 2024-02-05 DIAGNOSIS — R92.8 ABNORMAL MAMMOGRAM OF LEFT BREAST: ICD-10-CM

## 2024-02-05 NOTE — PROGRESS NOTES
Kelly Wang 57 yo female  Mammogram was over 5 years ago, which was normal   Screening Mammogram completed 1/29/2024.    FINDINGS:  The breasts are heterogeneously dense, which may obscure small masses.     In the upper outer left breast posteriorly there is ovoid asymmetry     There is no right dominant mass, suspicious calcification, or any secondary malignant sign.     IMPRESSION:     Left upper outer breast asymmetry posteriorly for which diagnostic workup is recommended     R0 -  CATEGORY 0: NEED ADDITIONAL IMAGING EVALUATION AND/OR PRIOR MAMMOGRAM FOR COMPARISON     Ten to twenty percent of all cancers can be categorized as hereditary and the clinical and financial value of identifying patients and families at risk is well documented. If you have a personal or family history of breast, ovarian, fallopian tube,   peritoneal or other cancer, please consult your physician regarding genetic counseling and testing.    Patient informed of results via MyChart and phone call.    Plan  - Diagnostic mammogram ordered of left breast  - Ultrasound limited of left breast ordered  - Will follow-up with results.

## 2024-02-08 ENCOUNTER — HOSPITAL ENCOUNTER (OUTPATIENT)
Dept: RADIOLOGY | Facility: MEDICAL CENTER | Age: 59
End: 2024-02-08
Payer: COMMERCIAL

## 2024-02-08 DIAGNOSIS — R92.8 ABNORMAL MAMMOGRAM OF LEFT BREAST: ICD-10-CM

## 2024-02-08 PROCEDURE — G0279 TOMOSYNTHESIS, MAMMO: HCPCS

## 2024-02-08 PROCEDURE — 76642 ULTRASOUND BREAST LIMITED: CPT | Mod: LT

## 2024-02-14 ENCOUNTER — TELEPHONE (OUTPATIENT)
Dept: MEDICAL GROUP | Facility: CLINIC | Age: 59
End: 2024-02-14
Payer: COMMERCIAL

## 2024-02-14 NOTE — TELEPHONE ENCOUNTER
Phone Number Called: [phone]      Call outcome: Spoke to patient regarding message below.    Message: mammo results. I have called patient to informed her of her results. No answer left message to call back.

## 2024-02-14 NOTE — TELEPHONE ENCOUNTER
----- Message from Uyen Garcia M.D. sent at 2/5/2024 10:19 AM PST -----  Please call patient and let her know she needs to complete the diagnostic mammogram.     THX

## 2024-04-30 ENCOUNTER — TELEPHONE (OUTPATIENT)
Dept: MEDICAL GROUP | Facility: CLINIC | Age: 59
End: 2024-04-30
Payer: COMMERCIAL

## 2024-04-30 NOTE — TELEPHONE ENCOUNTER
Phone Number Called: [phone] (home) [phone] (work)      Call outcome: Left detailed message for patient. Informed to call back with any additional questions.    Message: I have called the patient letting her know that she needs to make in appointment for further refills no answer left a message to call us back.

## 2024-07-08 ENCOUNTER — HOSPITAL ENCOUNTER (OUTPATIENT)
Dept: LAB | Facility: MEDICAL CENTER | Age: 59
End: 2024-07-08
Payer: COMMERCIAL

## 2024-07-08 DIAGNOSIS — E06.3 HYPOTHYROIDISM DUE TO HASHIMOTO'S THYROIDITIS: ICD-10-CM

## 2024-07-08 DIAGNOSIS — Z00.00 ANNUAL PHYSICAL EXAM: ICD-10-CM

## 2024-07-08 DIAGNOSIS — E03.8 HYPOTHYROIDISM DUE TO HASHIMOTO'S THYROIDITIS: ICD-10-CM

## 2024-07-08 LAB
ALBUMIN SERPL BCP-MCNC: 4.2 G/DL (ref 3.2–4.9)
ALBUMIN/GLOB SERPL: 1.3 G/DL
ALP SERPL-CCNC: 111 U/L (ref 30–99)
ALT SERPL-CCNC: 21 U/L (ref 2–50)
ANION GAP SERPL CALC-SCNC: 8 MMOL/L (ref 7–16)
AST SERPL-CCNC: 25 U/L (ref 12–45)
BASOPHILS # BLD AUTO: 1.3 % (ref 0–1.8)
BASOPHILS # BLD: 0.09 K/UL (ref 0–0.12)
BILIRUB SERPL-MCNC: 0.4 MG/DL (ref 0.1–1.5)
BUN SERPL-MCNC: 12 MG/DL (ref 8–22)
CALCIUM ALBUM COR SERPL-MCNC: 9.4 MG/DL (ref 8.5–10.5)
CALCIUM SERPL-MCNC: 9.6 MG/DL (ref 8.5–10.5)
CHLORIDE SERPL-SCNC: 105 MMOL/L (ref 96–112)
CHOLEST SERPL-MCNC: 241 MG/DL (ref 100–199)
CO2 SERPL-SCNC: 26 MMOL/L (ref 20–33)
CREAT SERPL-MCNC: 0.88 MG/DL (ref 0.5–1.4)
EOSINOPHIL # BLD AUTO: 0.27 K/UL (ref 0–0.51)
EOSINOPHIL NFR BLD: 3.9 % (ref 0–6.9)
ERYTHROCYTE [DISTWIDTH] IN BLOOD BY AUTOMATED COUNT: 45.9 FL (ref 35.9–50)
EST. AVERAGE GLUCOSE BLD GHB EST-MCNC: 111 MG/DL
FASTING STATUS PATIENT QL REPORTED: NORMAL
GFR SERPLBLD CREATININE-BSD FMLA CKD-EPI: 75 ML/MIN/1.73 M 2
GLOBULIN SER CALC-MCNC: 3.2 G/DL (ref 1.9–3.5)
GLUCOSE SERPL-MCNC: 89 MG/DL (ref 65–99)
HBA1C MFR BLD: 5.5 % (ref 4–5.6)
HCT VFR BLD AUTO: 46.2 % (ref 37–47)
HDLC SERPL-MCNC: 59 MG/DL
HGB BLD-MCNC: 15 G/DL (ref 12–16)
IMM GRANULOCYTES # BLD AUTO: 0.02 K/UL (ref 0–0.11)
IMM GRANULOCYTES NFR BLD AUTO: 0.3 % (ref 0–0.9)
LDLC SERPL CALC-MCNC: 157 MG/DL
LYMPHOCYTES # BLD AUTO: 2.24 K/UL (ref 1–4.8)
LYMPHOCYTES NFR BLD: 32.1 % (ref 22–41)
MCH RBC QN AUTO: 29.4 PG (ref 27–33)
MCHC RBC AUTO-ENTMCNC: 32.5 G/DL (ref 32.2–35.5)
MCV RBC AUTO: 90.4 FL (ref 81.4–97.8)
MONOCYTES # BLD AUTO: 0.52 K/UL (ref 0–0.85)
MONOCYTES NFR BLD AUTO: 7.5 % (ref 0–13.4)
NEUTROPHILS # BLD AUTO: 3.83 K/UL (ref 1.82–7.42)
NEUTROPHILS NFR BLD: 54.9 % (ref 44–72)
NRBC # BLD AUTO: 0 K/UL
NRBC BLD-RTO: 0 /100 WBC (ref 0–0.2)
PLATELET # BLD AUTO: 270 K/UL (ref 164–446)
PMV BLD AUTO: 10.1 FL (ref 9–12.9)
POTASSIUM SERPL-SCNC: 4.6 MMOL/L (ref 3.6–5.5)
PROT SERPL-MCNC: 7.4 G/DL (ref 6–8.2)
RBC # BLD AUTO: 5.11 M/UL (ref 4.2–5.4)
SODIUM SERPL-SCNC: 139 MMOL/L (ref 135–145)
TRIGL SERPL-MCNC: 124 MG/DL (ref 0–149)
TSH SERPL DL<=0.005 MIU/L-ACNC: 1.17 UIU/ML (ref 0.38–5.33)
WBC # BLD AUTO: 7 K/UL (ref 4.8–10.8)

## 2024-07-08 PROCEDURE — 80061 LIPID PANEL: CPT

## 2024-07-08 PROCEDURE — 36415 COLL VENOUS BLD VENIPUNCTURE: CPT

## 2024-07-08 PROCEDURE — 85025 COMPLETE CBC W/AUTO DIFF WBC: CPT

## 2024-07-08 PROCEDURE — 80053 COMPREHEN METABOLIC PANEL: CPT

## 2024-07-08 PROCEDURE — 84443 ASSAY THYROID STIM HORMONE: CPT

## 2024-07-08 PROCEDURE — 83036 HEMOGLOBIN GLYCOSYLATED A1C: CPT

## 2024-07-25 DIAGNOSIS — E06.3 HYPOTHYROIDISM DUE TO HASHIMOTO'S THYROIDITIS: ICD-10-CM

## 2024-07-25 DIAGNOSIS — E03.8 HYPOTHYROIDISM DUE TO HASHIMOTO'S THYROIDITIS: ICD-10-CM

## 2024-07-26 RX ORDER — OMEPRAZOLE 20 MG/1
20 CAPSULE, DELAYED RELEASE ORAL DAILY
Qty: 90 CAPSULE | Refills: 3 | OUTPATIENT
Start: 2024-07-26

## 2024-07-26 RX ORDER — LEVOTHYROXINE SODIUM 112 UG/1
112 TABLET ORAL
Qty: 90 TABLET | Refills: 0 | OUTPATIENT
Start: 2024-07-26

## 2024-07-30 RX ORDER — LEVOTHYROXINE SODIUM 112 UG/1
112 TABLET ORAL
Qty: 90 TABLET | Refills: 0 | Status: SHIPPED | OUTPATIENT
Start: 2024-07-30

## 2024-10-26 DIAGNOSIS — E06.3 HYPOTHYROIDISM DUE TO HASHIMOTO'S THYROIDITIS: ICD-10-CM

## 2024-10-28 NOTE — TELEPHONE ENCOUNTER
Received request via: Pharmacy    Was the patient seen in the last year in this department? Yes    Does the patient have an active prescription (recently filled or refills available) for medication(s) requested? No    [facility]    Does the patient have [facility] Plus and need 100-day supply? (This applies to ALL medications) Patient does not have SCP

## 2024-11-01 RX ORDER — LEVOTHYROXINE SODIUM 112 UG/1
112 TABLET ORAL
Qty: 90 TABLET | Refills: 0 | Status: SHIPPED | OUTPATIENT
Start: 2024-11-01

## 2025-01-03 NOTE — TELEPHONE ENCOUNTER
Received request via: Pharmacy    Was the patient seen in the last year in this department? Yes    Does the patient have an active prescription (recently filled or refills available) for medication(s) requested? No    Pharmacy Name:   [id number] - [place], [place] - [id number] AT [facility]       Does the patient have long term Plus and need 100-day supply? (This applies to ALL medications) Patient does not have SCP

## 2025-01-30 DIAGNOSIS — E06.3 HYPOTHYROIDISM DUE TO HASHIMOTO'S THYROIDITIS: ICD-10-CM

## 2025-01-30 NOTE — TELEPHONE ENCOUNTER
Received request via: Pharmacy    Was the patient seen in the last year in this department? Yes    Does the patient have an active prescription (recently filled or refills available) for medication(s) requested? No    Pharmacy Name: [facility] #[id number] - [place], [place] - [address] AT [facility]     Does the patient have [facility] Plus and need 100-day supply? (This applies to ALL medications) Patient does not have SCP

## 2025-02-02 RX ORDER — LEVOTHYROXINE SODIUM 112 UG/1
112 TABLET ORAL
Qty: 90 TABLET | Refills: 3 | Status: SHIPPED | OUTPATIENT
Start: 2025-02-02

## 2025-02-26 ENCOUNTER — OFFICE VISIT (OUTPATIENT)
Dept: URGENT CARE | Facility: CLINIC | Age: 60
End: 2025-02-26
Payer: COMMERCIAL

## 2025-02-26 ENCOUNTER — RESULTS FOLLOW-UP (OUTPATIENT)
Dept: URGENT CARE | Facility: CLINIC | Age: 60
End: 2025-02-26

## 2025-02-26 VITALS
BODY MASS INDEX: 36.1 KG/M2 | SYSTOLIC BLOOD PRESSURE: 126 MMHG | DIASTOLIC BLOOD PRESSURE: 76 MMHG | TEMPERATURE: 98.1 F | RESPIRATION RATE: 20 BRPM | HEART RATE: 75 BPM | WEIGHT: 230 LBS | OXYGEN SATURATION: 94 % | HEIGHT: 67 IN

## 2025-02-26 DIAGNOSIS — B34.9 VIRAL SYNDROME: ICD-10-CM

## 2025-02-26 LAB
FLUAV RNA SPEC QL NAA+PROBE: NEGATIVE
FLUBV RNA SPEC QL NAA+PROBE: NEGATIVE
RSV RNA SPEC QL NAA+PROBE: NEGATIVE
SARS-COV-2 RNA RESP QL NAA+PROBE: NEGATIVE

## 2025-02-26 RX ORDER — ALBUTEROL SULFATE 90 UG/1
2 INHALANT RESPIRATORY (INHALATION) EVERY 6 HOURS PRN
Qty: 8.5 G | Refills: 0 | Status: SHIPPED | OUTPATIENT
Start: 2025-02-26

## 2025-02-26 RX ORDER — AZELASTINE HYDROCHLORIDE, FLUTICASONE PROPIONATE 137; 50 UG/1; UG/1
SPRAY, METERED NASAL
Qty: 23 G | Refills: 0 | Status: SHIPPED | OUTPATIENT
Start: 2025-02-26

## 2025-02-26 RX ORDER — BENZONATATE 200 MG/1
200 CAPSULE ORAL
COMMUNITY
Start: 2025-02-25

## 2025-02-26 ASSESSMENT — ENCOUNTER SYMPTOMS
VOMITING: 0
SORE THROAT: 1
WHEEZING: 1
HEADACHES: 1
SINUS PAIN: 1
DIARRHEA: 0
COUGH: 1
RHINORRHEA: 1

## 2025-02-26 ASSESSMENT — FIBROSIS 4 INDEX: FIB4 SCORE: 1.21

## 2025-02-26 NOTE — LETTER
[facility]  [address]  [place] [address]     February 26, 2025    Patient: Kelly Wang   YOB: 1965   Date of Visit: 2/26/2025       To Whom It May Concern:    Kelly Wang was seen and treated in our department on 2/26/2025. It's my medical opinion that this patient would benefit from rest and recuperation for 4 days.   May return to work/school/ on 3/2/2025, or sooner if feeling better.      Sincerely,     IBAN Crooks.

## 2025-02-26 NOTE — PATIENT INSTRUCTIONS
Recommend adequate hydration, rest, deep breathing and coughing, ambulation as tolerated, OTC medications.  Recommend Delsym as it lasts 12 hours.  Recommend Coricidin cough and cold if taking hypertension medication. May use Ponaris nasal emollient, saline nasal spray for nasal congestion or to prevent nasal passage dryness or allergies.       Upper Respiratory Infection, Adult  An upper respiratory infection (URI) is a common viral infection of the nose, throat, and upper air passages that lead to the lungs. The most common type of URI is the common cold. URIs usually get better on their own, without medical treatment.  What are the causes?  A URI is caused by a virus. You may catch a virus by:  Breathing in droplets from an infected person's cough or sneeze.  Touching something that has been exposed to the virus (is contaminated) and then touching your mouth, nose, or eyes.  What increases the risk?  You are more likely to get a URI if:  You are very young or very old.  You have close contact with others, such as at work, school, or a health care facility.  You smoke.  You have long-term (chronic) heart or lung disease.  You have a weakened disease-fighting system (immune system).  You have nasal allergies or asthma.  You are experiencing a lot of stress.  You have poor nutrition.  What are the signs or symptoms?  A URI usually involves some of the following symptoms:  Runny or stuffy (congested) nose.  Cough.  Sneezing.  Sore throat.  Headache.  Fatigue.  Fever.  Loss of appetite.  Pain in your forehead, behind your eyes, and over your cheekbones (sinus pain).  Muscle aches.  Redness or irritation of the eyes.  Pressure in the ears or face.  How is this diagnosed?  This condition may be diagnosed based on your medical history and symptoms, and a physical exam. Your health care provider may use a swab to take a mucus sample from your nose (nasal swab). This sample can be tested to determine what virus is causing  the illness.  How is this treated?  URIs usually get better on their own within 7-10 days. Medicines cannot cure URIs, but your health care provider may recommend certain medicines to help relieve symptoms, such as:  Over-the-counter cold medicines.  Cough suppressants. Coughing is a type of defense against infection that helps to clear the respiratory system, so take these medicines only as recommended by your health care provider.  Fever-reducing medicines.  Follow these instructions at home:  Activity  Rest as needed.  If you have a fever, stay home from work or school until your fever is gone or until your health care provider says your URI cannot spread to other people (is no longer contagious). Your health care provider may have you wear a face mask to prevent your infection from spreading.  Relieving symptoms  Gargle with a mixture of salt and water 3-4 times a day or as needed. To make salt water, completely dissolve ½-1 tsp (3-6 g) of salt in 1 cup (237 mL) of warm water.  Use a cool-mist humidifier to add moisture to the air. This can help you breathe more easily.  Eating and drinking    Drink enough fluid to keep your urine pale yellow.  Eat soups and other clear broths.  General instructions    Take over-the-counter and prescription medicines only as told by your health care provider. These include cold medicines, fever reducers, and cough suppressants.  Do not use any products that contain nicotine or tobacco. These products include cigarettes, chewing tobacco, and vaping devices, such as e-cigarettes. If you need help quitting, ask your health care provider.  Stay away from secondhand smoke.  Stay up to date on all immunizations, including the yearly (annual) flu vaccine.  Keep all follow-up visits. This is important.  How to prevent the spread of infection to others  URIs can be contagious. To prevent the infection from spreading:  Wash your hands with soap and water for at least 20 seconds. If soap  and water are not available, use hand .  Avoid touching your mouth, face, eyes, or nose.  Cough or sneeze into a tissue or your sleeve or elbow instead of into your hand or into the air.    Contact a health care provider if:  You are getting worse instead of better.  You have a fever or chills.  Your mucus is brown or red.  You have yellow or brown discharge coming from your nose.  You have pain in your face, especially when you bend forward.  You have swollen neck glands.  You have pain while swallowing.  You have white areas in the back of your throat.  Get help right away if:  You have shortness of breath that gets worse.  You have severe or persistent:  Headache.  Ear pain.  Sinus pain.  Chest pain.  You have chronic lung disease along with any of the following:  Making high-pitched whistling sounds when you breathe, most often when you breathe out (wheezing).  Prolonged cough (more than 14 days).  Coughing up blood.  A change in your usual mucus.  You have a stiff neck.  You have changes in your:  Vision.  Hearing.  Thinking.  Mood.  These symptoms may be an emergency. Get help right away. Call 911.  Do not wait to see if the symptoms will go away.  Do not drive yourself to the hospital.  Summary  An upper respiratory infection (URI) is a common infection of the nose, throat, and upper air passages that lead to the lungs.  A URI is caused by a virus.  URIs usually get better on their own within 7-10 days.  Medicines cannot cure URIs, but your health care provider may recommend certain medicines to help relieve symptoms.  This information is not intended to replace advice given to you by your health care provider. Make sure you discuss any questions you have with your health care provider.  Document Revised: 07/20/2022 Document Reviewed: 07/20/2022  Elsevier Patient Education © 2023 [employer]

## 2025-02-26 NOTE — PROGRESS NOTES
"Patient/parent/guardian has consented to treatment and for use of patient information for treatment and billing purposes.  Subjective:   Kelly Wang  is a 59 y.o. female who presents for Respiratory Distress (SOB, family sick, pt states she has heard crackles in lungs since Sat.)       URI   This is a new problem. The current episode started in the past 7 days (Saturday 22). The problem has been gradually worsening. There has been no fever. Associated symptoms include congestion, coughing, headaches, rhinorrhea, sinus pain, a sore throat and wheezing. Pertinent negatives include no diarrhea, ear pain, plugged ear sensation or vomiting. She has tried acetaminophen and NSAIDs (elderberry, vitamin c) for the symptoms. The treatment provided mild relief.       Review of Systems   HENT:  Positive for congestion, rhinorrhea, sinus pain and sore throat. Negative for ear pain.    Respiratory:  Positive for cough and wheezing.    Gastrointestinal:  Negative for diarrhea and vomiting.   Neurological:  Positive for headaches.         CURRENT MEDICATIONS:  benzonatate  ibuprofen Tabs  levothyroxine Tabs  lidocaine Ptch  omeprazole  Allergies:   No Known Allergies  Current Problems: Kelly Wang does not have any pertinent problems on file.  Past Surgical Hx:  No past surgical history on file.   Past Social Hx:  reports that she has never smoked. She has never used smokeless tobacco. She reports that she does not currently use alcohol. She reports that she does not currently use drugs.    Objective:   /76 (BP Location: Right arm, Patient Position: Sitting, BP Cuff Size: Adult long)   Pulse 75   Temp 36.7 °C (98.1 °F) (Temporal)   Resp 20   Ht 1.702 m (5' 7\")   Wt 104 kg (230 lb)   SpO2 94%   BMI 36.02 kg/m²   Physical Exam  Vitals and nursing note reviewed.   Constitutional:       General: She is not in acute distress.     Appearance: Normal appearance. She is normal weight. She is ill-appearing.   HENT:      " Head: Normocephalic and atraumatic.      Right Ear: External ear normal. Tenderness present. No swelling. A middle ear effusion is present. Tympanic membrane is bulging. Tympanic membrane is not erythematous.      Left Ear: External ear normal. Tenderness present. No swelling. A middle ear effusion is present. Tympanic membrane is bulging. Tympanic membrane is not erythematous.      Nose: Mucosal edema, congestion and rhinorrhea present. Rhinorrhea is clear.      Right Sinus: No maxillary sinus tenderness or frontal sinus tenderness.      Left Sinus: No maxillary sinus tenderness or frontal sinus tenderness.      Mouth/Throat:      Mouth: Mucous membranes are moist.      Pharynx: Postnasal drip present. No oropharyngeal exudate or posterior oropharyngeal erythema.   Eyes:      Extraocular Movements: Extraocular movements intact.      Conjunctiva/sclera: Conjunctivae normal.      Pupils: Pupils are equal, round, and reactive to light.   Cardiovascular:      Rate and Rhythm: Normal rate and regular rhythm.      Pulses: Normal pulses.      Heart sounds: Normal heart sounds.   Pulmonary:      Effort: Pulmonary effort is normal.      Breath sounds: Transmitted upper airway sounds present. Examination of the right-lower field reveals decreased breath sounds and rhonchi. Examination of the left-lower field reveals decreased breath sounds. Decreased breath sounds and rhonchi present. No wheezing or rales.   Abdominal:      Palpations: Abdomen is soft.      Tenderness: There is no abdominal tenderness.   Musculoskeletal:         General: Normal range of motion.      Cervical back: Normal range of motion and neck supple.   Skin:     General: Skin is warm and dry.      Findings: No rash.   Neurological:      General: No focal deficit present.      Mental Status: She is alert and oriented to person, place, and time.   Psychiatric:         Mood and Affect: Mood normal.         Behavior: Behavior normal.       Results for orders  placed or performed in visit on 02/26/25   POCT CEPHEID COV-2, FLU A/B, RSV - PCR    Collection Time: 02/26/25 10:02 AM   Result Value Ref Range    SARS-CoV-2 by PCR Negative Negative, Invalid    Influenza virus A RNA Negative Negative, Invalid    Influenza virus B, PCR Negative Negative, Invalid    RSV, PCR Negative Negative, Invalid       Assessment/Plan:   1. Viral syndrome  - POCT CEPHEID COV-2, FLU A/B, RSV - PCR  - Azelastine-Fluticasone 137-50 MCG/ACT Suspension; One spray per nostril twice daily as needed for congestion.  Dispense: 23 g; Refill: 0  - albuterol 108 (90 Base) MCG/ACT Aero Soln inhalation aerosol; Inhale 2 Puffs every 6 hours as needed for Shortness of Breath.  Dispense: 8.5 g; Refill: 0    Other orders  - benzonatate (TESSALON) 200 MG capsule; 200 mg. (Patient not taking: Reported on 2/26/2025)    Point-of-care viral testing completed.  Will contact patient with any positive results.  Symptom management medication sent to pharmacy with instructions for use. Recommend adequate hydration, rest, deep breathing and coughing, ambulation as tolerated, OTC medications.  Recommend Delsym as it lasts 12 hours.  Recommend Coricidin cough and cold if taking hypertension medication. May use Ponaris nasal emollient, saline nasal spray for nasal congestion or to prevent nasal passage dryness or allergies.  Work note provided.   Red flags, RTC and ER precautions discussed, with patient confirming their understanding of  need for immediate follow-up.  Discussed medication management options, risks and benefits, and alternatives to treatment plan agreed upon. Instructed to continue  medications without changes as ordered by primary care unless aforementioned above.  Patient expresses understanding and agrees to plan of care. All questions or concerns answered. For my MDM, I have personally reviewed previous notes, and test results as pertinent to today's visit.  11:16 AM  Point-of-care testing negative.  No  change to treatment plan.  Please note that this dictation was created using voice recognition software. I have made every reasonable attempt to correct obvious errors,  but there may be grammar errors, and possibly content that I did not discover before finalizing the note.   This note was electronically signed by KATE Crooks

## 2025-03-02 ENCOUNTER — OFFICE VISIT (OUTPATIENT)
Dept: URGENT CARE | Facility: CLINIC | Age: 60
End: 2025-03-02
Payer: COMMERCIAL

## 2025-03-02 ENCOUNTER — APPOINTMENT (OUTPATIENT)
Dept: RADIOLOGY | Facility: IMAGING CENTER | Age: 60
End: 2025-03-02
Attending: PHYSICIAN ASSISTANT
Payer: COMMERCIAL

## 2025-03-02 VITALS
HEIGHT: 67 IN | DIASTOLIC BLOOD PRESSURE: 88 MMHG | SYSTOLIC BLOOD PRESSURE: 130 MMHG | TEMPERATURE: 100 F | RESPIRATION RATE: 16 BRPM | OXYGEN SATURATION: 94 % | WEIGHT: 247 LBS | BODY MASS INDEX: 38.77 KG/M2 | HEART RATE: 81 BPM

## 2025-03-02 DIAGNOSIS — J32.9 RHINOSINUSITIS: ICD-10-CM

## 2025-03-02 DIAGNOSIS — R05.1 ACUTE COUGH: ICD-10-CM

## 2025-03-02 DIAGNOSIS — J98.8 RTI (RESPIRATORY TRACT INFECTION): ICD-10-CM

## 2025-03-02 PROCEDURE — 3075F SYST BP GE 130 - 139MM HG: CPT | Performed by: PHYSICIAN ASSISTANT

## 2025-03-02 PROCEDURE — 3079F DIAST BP 80-89 MM HG: CPT | Performed by: PHYSICIAN ASSISTANT

## 2025-03-02 PROCEDURE — 99214 OFFICE O/P EST MOD 30 MIN: CPT | Performed by: PHYSICIAN ASSISTANT

## 2025-03-02 RX ORDER — DEXTROMETHORPHAN HYDROBROMIDE AND PROMETHAZINE HYDROCHLORIDE 15; 6.25 MG/5ML; MG/5ML
5 SYRUP ORAL EVERY 4 HOURS PRN
Qty: 118 ML | Refills: 0 | Status: SHIPPED | OUTPATIENT
Start: 2025-03-02

## 2025-03-02 RX ORDER — BENZONATATE 100 MG/1
100 CAPSULE ORAL 3 TIMES DAILY PRN
Qty: 20 CAPSULE | Refills: 0 | Status: SHIPPED | OUTPATIENT
Start: 2025-03-02

## 2025-03-02 ASSESSMENT — FIBROSIS 4 INDEX: FIB4 SCORE: 1.21

## 2025-03-02 NOTE — PROGRESS NOTES
Subjective:     Verbal consent was acquired by the patient to use TRAN.SL ambient listening note generation during this visit     Kelly Wang is a 59 y.o. female who presents for Cough (Patient explains coughing of blood started this morning)       History of Present Illness    Reviewed previous note 2/26/2025 from the urgent care    The patient presents for evaluation of ongoing respiratory symptoms.    She was previously evaluated at [facility] on 02/26/2025, during which a viral swab test was conducted and returned negative. Since then, she has been experiencing severe fatigue, epistaxis, and hemoptysis. Her cough has progressively worsened, and she reports waking up with intense headaches, which are somewhat alleviated by Tylenol Arthritis. She also reports intermittent fevers.  She has no history of pneumonia or COPD but has a 40-year history of smoking. Additionally, she experiences significant sinus pressure and pain in the forehead.  She is having purulent nasal discharge.  Her cough is persistent throughout the day and night. She also reports xerostomia, necessitating frequent water intake, which subsequently leads to increased urination. She has not been prescribed any cough medications. She has been sick for 5 years. She takes elderberry and vitamin D. She was prescribed Flonase and albuterol, but discontinued Flonase due to bleeding.        SOCIAL HISTORY  She has a 40-year history of smoking.    MEDICATIONS  Current: Tylenol Arthritis, Flonase, albuterol, elderberry, vitamin D          Medications:  albuterol Aers  Azelastine-Fluticasone Susp  benzonatate  ibuprofen Tabs  levothyroxine Tabs  lidocaine Ptch  omeprazole    Allergies:             Patient has no known allergies.    Past Social Hx:  Kelly Wang  reports that she has never smoked. She has never used smokeless tobacco. She reports that she does not currently use alcohol. She reports that she does not currently use drugs.  "          Problem list, medications, and allergies reviewed by myself today in Epic.     Objective:     /88 (BP Location: Right arm, Patient Position: Sitting, BP Cuff Size: Large adult)   Pulse 81   Temp 37.8 °C (100 °F)   Resp 16   Ht 1.702 m (5' 7\")   Wt 112 kg (247 lb)   SpO2 94%   BMI 38.69 kg/m²     Physical Exam  Vitals and nursing note reviewed.   Constitutional:       General: She is not in acute distress.     Appearance: Normal appearance. She is well-developed. She is not ill-appearing, toxic-appearing or diaphoretic.   HENT:      Head: Normocephalic.      Right Ear: Ear canal and external ear normal. No tenderness. A middle ear effusion is present. No mastoid tenderness. Tympanic membrane is injected. Tympanic membrane is not perforated or bulging. Tympanic membrane has decreased mobility.      Left Ear: Ear canal and external ear normal. No tenderness. A middle ear effusion is present. No mastoid tenderness. Tympanic membrane is injected. Tympanic membrane is not perforated or bulging. Tympanic membrane has decreased mobility.      Nose: Mucosal edema, congestion and rhinorrhea present.      Right Nostril: No foreign body.      Left Nostril: No foreign body.      Right Turbinates: Swollen.      Left Turbinates: Swollen.      Right Sinus: Maxillary sinus tenderness and frontal sinus tenderness present.      Left Sinus: Maxillary sinus tenderness and frontal sinus tenderness present.      Mouth/Throat:      Mouth: Mucous membranes are moist.      Pharynx: Uvula midline. Posterior oropharyngeal erythema present. No pharyngeal swelling, oropharyngeal exudate or uvula swelling.      Tonsils: No tonsillar exudate or tonsillar abscesses.      Comments: Mild pharyngeal edema.  No tonsillar exudate.  Eyes:      General:         Right eye: No discharge.         Left eye: No discharge.      Extraocular Movements: Extraocular movements intact.      Conjunctiva/sclera: Conjunctivae normal.      Pupils: " Pupils are equal, round, and reactive to light.   Cardiovascular:      Rate and Rhythm: Normal rate and regular rhythm.      Pulses: Normal pulses.      Heart sounds: Normal heart sounds. No murmur heard.     No friction rub.   Pulmonary:      Effort: Pulmonary effort is normal. No tachypnea, accessory muscle usage or respiratory distress.      Breath sounds: Normal breath sounds and air entry. No stridor or decreased air movement. No decreased breath sounds, wheezing, rhonchi or rales.      Comments: Lungs are clear to auscultation bilaterally, no rhonchi rales or wheezes  Chest:      Chest wall: No tenderness.   Abdominal:      Palpations: Abdomen is soft.   Musculoskeletal:         General: Normal range of motion.      Cervical back: Normal range of motion. No rigidity.      Right lower leg: No edema.      Left lower leg: No edema.   Lymphadenopathy:      Cervical: No cervical adenopathy.   Skin:     General: Skin is warm and dry.   Neurological:      Mental Status: She is alert and oriented to person, place, and time.   Psychiatric:         Behavior: Behavior is cooperative.                 Assessment/Plan:     Diagnosis and Associated Orders:     1. Rhinosinusitis  - amoxicillin-clavulanate (AUGMENTIN) 875-125 MG Tab; Take 1 Tablet by mouth 2 times a day for 7 days.  Dispense: 14 Tablet; Refill: 0    2. RTI (respiratory tract infection)  - amoxicillin-clavulanate (AUGMENTIN) 875-125 MG Tab; Take 1 Tablet by mouth 2 times a day for 7 days.  Dispense: 14 Tablet; Refill: 0    3. Acute cough  - DX-CHEST-2 VIEWS; Future  - benzonatate (TESSALON) 100 MG Cap; Take 1 Capsule by mouth 3 times a day as needed for Cough.  Dispense: 20 Capsule; Refill: 0  - promethazine-dextromethorphan (PROMETHAZINE-DM) 6.25-15 MG/5ML syrup; Take 5 mL by mouth every four hours as needed for Cough.  Dispense: 118 mL; Refill: 0        Medical Decision Making:    Pleasant 59 y.o. presents to clinic with:    Assessment & Plan  Reviewed  patient's previous office visit pleasant 59-year-old female with greater than 1 week history of progressive respiratory symptoms.  She appears to have developed a bacterial sinusitis and is now with productive cough, purulent nature with some blood-tinged sputum.  I am going to cover for bacterial infection at this time.  Her lungs sound clear.  She is nontachypneic and nonhypoxic.  Did consider pneumonia however shared medical decision making was utilized to determine to cover her with antibiotics with a future order for chest x-ray if symptoms do not progress or she develops increasing shortness of breath or persistent fevers.   Augmentin 875 mg twice daily has been prescribed. Mucinex has been recommended to help break up mucus. Tessalon Perles have been prescribed for daytime use to manage the cough. Promethazine has been prescribed for nighttime use to aid sleep and manage the cough. She is advised to continue using the albuterol inhaler to keep the bronchial passages open. If there is no significant improvement within the next 48 hours, a chest x-ray will be performed to rule out pneumonia.        I personally reviewed prior external notes and test results pertinent to today's visit. Patient is clinically stable at today's urgent care visit.  Patient appears nontoxic with no acute distress noted. Appropriate for outpatient care at this time.  Return to clinic or go to ED if symptoms worsen or persist.  Red flag symptoms discussed.  Patient/Parent/Guardian voices understanding.   All side effects of medication discussed including allergic response, GI upset, tendon injury, rash, sedation etc    Please note that this dictation was created using voice recognition software. I have made a reasonable attempt to correct obvious errors, but I expect that there are errors of grammar and possibly content that I did not discover before finalizing the note.    This note was electronically signed by Adelita Price  [email or web address]

## 2025-03-03 ENCOUNTER — HOSPITAL ENCOUNTER (OUTPATIENT)
Dept: RADIOLOGY | Facility: MEDICAL CENTER | Age: 60
End: 2025-03-03
Attending: PHYSICIAN ASSISTANT
Payer: COMMERCIAL

## 2025-03-03 ENCOUNTER — APPOINTMENT (OUTPATIENT)
Dept: RADIOLOGY | Facility: IMAGING CENTER | Age: 60
End: 2025-03-03
Attending: PHYSICIAN ASSISTANT
Payer: COMMERCIAL

## 2025-03-03 PROCEDURE — 71046 X-RAY EXAM CHEST 2 VIEWS: CPT

## 2025-03-15 ENCOUNTER — APPOINTMENT (OUTPATIENT)
Dept: TELEHEALTH | Facility: TELEMEDICINE | Age: 60
End: 2025-03-15
Payer: COMMERCIAL

## 2025-03-16 ENCOUNTER — OFFICE VISIT (OUTPATIENT)
Dept: URGENT CARE | Facility: PHYSICIAN GROUP | Age: 60
End: 2025-03-16
Payer: COMMERCIAL

## 2025-03-16 ENCOUNTER — PATIENT MESSAGE (OUTPATIENT)
Dept: SPORTS MEDICINE | Facility: OTHER | Age: 60
End: 2025-03-16

## 2025-03-16 VITALS
OXYGEN SATURATION: 92 % | HEIGHT: 67 IN | SYSTOLIC BLOOD PRESSURE: 126 MMHG | HEART RATE: 86 BPM | RESPIRATION RATE: 18 BRPM | BODY MASS INDEX: 38.77 KG/M2 | DIASTOLIC BLOOD PRESSURE: 82 MMHG | WEIGHT: 247 LBS | TEMPERATURE: 98.7 F

## 2025-03-16 DIAGNOSIS — J22 LOWER RESPIRATORY INFECTION: ICD-10-CM

## 2025-03-16 PROCEDURE — 3074F SYST BP LT 130 MM HG: CPT | Performed by: FAMILY MEDICINE

## 2025-03-16 PROCEDURE — 3079F DIAST BP 80-89 MM HG: CPT | Performed by: FAMILY MEDICINE

## 2025-03-16 PROCEDURE — 99213 OFFICE O/P EST LOW 20 MIN: CPT | Performed by: FAMILY MEDICINE

## 2025-03-16 RX ORDER — DOXYCYCLINE HYCLATE 100 MG
100 TABLET ORAL 2 TIMES DAILY
Qty: 14 TABLET | Refills: 0 | Status: SHIPPED | OUTPATIENT
Start: 2025-03-16

## 2025-03-16 ASSESSMENT — FIBROSIS 4 INDEX: FIB4 SCORE: 1.21

## 2025-03-16 ASSESSMENT — ENCOUNTER SYMPTOMS
FEVER: 0
COUGH: 1

## 2025-03-16 NOTE — PROGRESS NOTES
Subjective:     Kelly Wang is a 59 y.o. female who presents for Cough (Was seen on the 4th still not getting better,hearing crackling in chest)    HPI  Pt presents for repeat evaluation   Patient has been ill for almost a month  Has had cough and nasal congestion  Initially seen 2/26/2025 and was tested for COVID/influenza/RSV, all test negative  Treated with azelastine fluticasone nasal spray and albuterol inhaler at that time  Patient had repeat evaluation 3/2/2025, and had chest x-ray which was clear.  Treated with Augmentin.  Was making small improvements, but then worsened the day after stopping abx   Having continued cough which is productive     Review of Systems   Constitutional:  Negative for fever.   HENT:  Positive for congestion.    Respiratory:  Positive for cough.        PMH:  has no past medical history on file.  MEDS:   Current Outpatient Medications:     benzonatate (TESSALON) 100 MG Cap, Take 1 Capsule by mouth 3 times a day as needed for Cough., Disp: 20 Capsule, Rfl: 0    promethazine-dextromethorphan (PROMETHAZINE-DM) 6.25-15 MG/5ML syrup, Take 5 mL by mouth every four hours as needed for Cough., Disp: 118 mL, Rfl: 0    benzonatate (TESSALON) 200 MG capsule, 200 mg. (Patient not taking: Reported on 3/2/2025), Disp: , Rfl:     Azelastine-Fluticasone 137-50 MCG/ACT Suspension, One spray per nostril twice daily as needed for congestion., Disp: 23 g, Rfl: 0    albuterol 108 (90 Base) MCG/ACT Aero Soln inhalation aerosol, Inhale 2 Puffs every 6 hours as needed for Shortness of Breath., Disp: 8.5 g, Rfl: 0    levothyroxine (SYNTHROID) 112 MCG Tab, TAKE 1 TABLET BY MOUTH EVERY MORNING ON AN EMPTY STOMACH, Disp: 90 Tablet, Rfl: 3    omeprazole (PRILOSEC) 20 MG delayed-release capsule, TAKE 1 CAPSULE BY MOUTH EVERY DAY, Disp: 90 Capsule, Rfl: 3    Lidocaine 4 % Patch, Apply 1 Patch topically 2 times a day as needed (Shoulder pain). (Patient not taking: Reported on 3/2/2025), Disp: 30 Patch, Rfl: 2    " ibuprofen (MOTRIN) 800 MG Tab, Take 1 Tablet by mouth every 8 hours as needed for Mild Pain or Moderate Pain., Disp: 90 Tablet, Rfl: 3  ALLERGIES: No Known Allergies  SURGHX: History reviewed. No pertinent surgical history.  SOCHX:  reports that she has never smoked. She has never used smokeless tobacco. She reports that she does not currently use alcohol. She reports that she does not currently use drugs.     Objective:   /82   Pulse 86   Temp 37.1 °C (98.7 °F)   Resp 18   Ht 1.702 m (5' 7\")   Wt 112 kg (247 lb)   SpO2 92%   BMI 38.69 kg/m²     Physical Exam  Constitutional:       General: She is not in acute distress.     Appearance: She is well-developed. She is not diaphoretic.   HENT:      Head: Normocephalic and atraumatic.      Right Ear: Tympanic membrane, ear canal and external ear normal.      Left Ear: Tympanic membrane, ear canal and external ear normal.      Nose: Congestion present.      Mouth/Throat:      Mouth: Mucous membranes are moist.      Pharynx: Oropharynx is clear. No oropharyngeal exudate or posterior oropharyngeal erythema.   Neck:      Trachea: No tracheal deviation.   Cardiovascular:      Rate and Rhythm: Normal rate and regular rhythm.   Pulmonary:      Effort: Pulmonary effort is normal. No respiratory distress.      Breath sounds: Normal breath sounds. No wheezing or rales.   Musculoskeletal:      Cervical back: Normal range of motion and neck supple. No tenderness.   Lymphadenopathy:      Cervical: No cervical adenopathy.   Skin:     General: Skin is warm and dry.   Neurological:      Mental Status: She is alert.       Assessment/Plan:   Assessment    1. Lower respiratory infection    Pt with lower respiratory tract infection for the past month.  Did make some improvements with Augmentin, but never fully resolved.  Her history is suspicious for atypical bacterial infection.  Recommended course of doxycycline.  Chest had chest x-ray 2 weeks ago, lungs clear, and did not " recommend repeat chest x-ray today.  All questions answered and will follow-up in the urgent care as needed.